# Patient Record
Sex: MALE | Race: WHITE | Employment: FULL TIME | URBAN - METROPOLITAN AREA
[De-identification: names, ages, dates, MRNs, and addresses within clinical notes are randomized per-mention and may not be internally consistent; named-entity substitution may affect disease eponyms.]

---

## 2019-04-22 ENCOUNTER — APPOINTMENT (OUTPATIENT)
Dept: GENERAL RADIOLOGY | Age: 34
End: 2019-04-22
Attending: FAMILY MEDICINE
Payer: COMMERCIAL

## 2019-04-22 ENCOUNTER — HOSPITAL ENCOUNTER (OUTPATIENT)
Age: 34
Discharge: HOME | End: 2019-04-22
Attending: FAMILY MEDICINE
Payer: COMMERCIAL

## 2019-04-22 VITALS
HEIGHT: 73 IN | RESPIRATION RATE: 20 BRPM | OXYGEN SATURATION: 96 % | HEART RATE: 78 BPM | SYSTOLIC BLOOD PRESSURE: 135 MMHG | DIASTOLIC BLOOD PRESSURE: 82 MMHG | TEMPERATURE: 99 F | WEIGHT: 300 LBS | BODY MASS INDEX: 39.76 KG/M2

## 2019-04-22 DIAGNOSIS — S90.32XA CONTUSION OF LEFT FOOT, INITIAL ENCOUNTER: ICD-10-CM

## 2019-04-22 DIAGNOSIS — S93.402A MODERATE LEFT ANKLE SPRAIN, INITIAL ENCOUNTER: Primary | ICD-10-CM

## 2019-04-22 PROCEDURE — 99203 OFFICE O/P NEW LOW 30 MIN: CPT

## 2019-04-22 PROCEDURE — 73610 X-RAY EXAM OF ANKLE: CPT | Performed by: FAMILY MEDICINE

## 2019-04-22 PROCEDURE — 73630 X-RAY EXAM OF FOOT: CPT | Performed by: FAMILY MEDICINE

## 2019-04-22 NOTE — ED INITIAL ASSESSMENT (HPI)
Pt presents tonight with c/o left ankle injury that occurred on Saturday. Pt states that he stepped off a curb wrong and fell onto his left ankle.

## 2019-04-23 NOTE — ED PROVIDER NOTES
Patient Seen in: [address]    History   Patient presents with:   Ankle Injury    Stated Complaint: LEFT ANKLE TRIPPED AND TWISTED 3 DAYS    HPI    27-year-old male presents today with swelling pain to the lateral part of th intact Achilles tendon. Good cap refill, pulses, sensations. Proximal tib-fib has no focal tenderness normal knee flexion, extension. Patient is able to put weight on the affected extremity with mild limp.     ED Course   Labs Reviewed - No data to displ wrap applied. Weightbearing as tolerated. Ice, elevation, ibuprofen for pain. Follow-up with PCP of foot and ankle specialist in 1-2 weeks for recheck.             Disposition and Plan     Clinical Impression:  Moderate left ankle sprain, initial encount

## 2020-11-03 ENCOUNTER — HOSPITAL ENCOUNTER (OUTPATIENT)
Age: 35
Discharge: HOME | End: 2020-11-03
Payer: COMMERCIAL

## 2020-11-03 VITALS
HEIGHT: 73 IN | DIASTOLIC BLOOD PRESSURE: 96 MMHG | HEART RATE: 95 BPM | SYSTOLIC BLOOD PRESSURE: 149 MMHG | OXYGEN SATURATION: 95 % | RESPIRATION RATE: 18 BRPM | WEIGHT: 315 LBS | BODY MASS INDEX: 41.75 KG/M2 | TEMPERATURE: 98 F

## 2020-11-03 DIAGNOSIS — J06.9 VIRAL URI: Primary | ICD-10-CM

## 2020-11-03 PROCEDURE — 99203 OFFICE O/P NEW LOW 30 MIN: CPT | Performed by: PHYSICIAN ASSISTANT

## 2020-11-03 NOTE — ED INITIAL ASSESSMENT (HPI)
The patient is here for evaluation of a headache and dry cough that both started this morning. Denies any other symptoms and Ed Reynoso [email or web address] already assessed the patient.

## 2020-11-03 NOTE — ED PROVIDER NOTES
Patient Seen in: [facility]      History   No chief complaint on file.     Stated Complaint: HEADACHE  / COVID TEST / COUGH    HPI    CHIEF COMPLAINT: Headache, runny nose, wants Covid testing     HISTORY OF PRESENT ILLNESS: Patient i [11/03/20 1533]   BP (!) 149/96   Pulse 95   Resp 18   Temp 97.9 °F (36.6 °C)   Temp src Temporal   SpO2 95 %   O2 Device None (Room air)       Current:BP (!) 149/96   Pulse 95   Temp 97.9 °F (36.6 °C) (Temporal)   Resp 18   SpO2 95%         Physical Exam [address] [id number] [phone]    In 2 days  If symptoms worsen          Medications Prescribed:  There are no discharge medications for this patient.

## 2020-11-07 NOTE — PROGRESS NOTES
Pt. notified of [address] test results via [facility], advised to call IC to discuss results.